# Patient Record
Sex: MALE | Race: WHITE | Employment: FULL TIME | URBAN - METROPOLITAN AREA
[De-identification: names, ages, dates, MRNs, and addresses within clinical notes are randomized per-mention and may not be internally consistent; named-entity substitution may affect disease eponyms.]

---

## 2018-11-19 ENCOUNTER — OFFICE VISIT (OUTPATIENT)
Dept: ORTHOPEDIC SURGERY | Age: 58
End: 2018-11-19
Payer: COMMERCIAL

## 2018-11-19 VITALS
WEIGHT: 220 LBS | DIASTOLIC BLOOD PRESSURE: 79 MMHG | BODY MASS INDEX: 33.34 KG/M2 | HEART RATE: 92 BPM | SYSTOLIC BLOOD PRESSURE: 129 MMHG | HEIGHT: 68 IN

## 2018-11-19 DIAGNOSIS — S72.114A CLOSED NONDISPLACED FRACTURE OF GREATER TROCHANTER OF RIGHT FEMUR, INITIAL ENCOUNTER (HCC): Primary | ICD-10-CM

## 2018-11-19 PROCEDURE — 99203 OFFICE O/P NEW LOW 30 MIN: CPT | Performed by: PHYSICIAN ASSISTANT

## 2018-11-19 RX ORDER — ACETAMINOPHEN 500 MG
500 TABLET ORAL EVERY 4 HOURS PRN
Qty: 60 TABLET | Refills: 0 | Status: SHIPPED | OUTPATIENT
Start: 2018-11-19 | End: 2018-12-11

## 2018-11-19 NOTE — PROGRESS NOTES
Date:  2018    Name:  Bhavana Pritchard  Address:  [address]    :  1960      Age:   59 y.o.    SSN:  [id number]      Medical Record Number:  [id number]    Reason for Visit:    Chief Complaint    Hip Pain (np right hip and groin pain x1wk after fall on c)      DOS:2018     HPI: Jose Luis Love is a 59 y.o. male here today for evaluation of right hip. On Tuesday, 2018, he was at the gas station and tripped over a parking curve and fell directly onto his right lateral hip. He was able to get up and ambulate, but there was excruciating pain. The following day he went to the [address] [place] [address] in [place], [place]. They diagnosed him with a right hip closed, mildly comminuted, nondisplaced avulsion greater trochanteric fracture. Diagnosis was confirmed via XR (2018) and CT (2018). They treated with crutches and #12 hydrocodone with discharge instructions to followup with us. His pain has been improving. He still gets occasional groin pain, the same groin pain that was present at time of injury. Overall his pain seems well controlled. No pain at rest. Denies any neurovascular changes. No numbness or tingling in lower extremity. No history of hip problems in the past.     Pain Assessment  Location of Pain:  (Hip)  Location Modifiers: Right  Severity of Pain: 7  Quality of Pain: Sharp (stabbing)  Duration of Pain: Persistent  Frequency of Pain: Intermittent  Aggravating Factors: Standing, Walking  Limiting Behavior: Yes  Relieving Factors: Rest  Result of Injury: Yes  Work-Related Injury: No  Are there other pain locations you wish to document?: No  ROS: All systems reviewed on patient intake form. Pertinent items are noted in HPI. Past Medical History:   Diagnosis Date    Hypertension         No past surgical history on file. No family history on file.     Social History     Social History    Marital status: Single     Spouse name: N/A    Number of children: N/A  Years of education: N/A     Social History Main Topics    Smoking status: Current Every Day Smoker     Years: 30.00     Types: Cigarettes    Smokeless tobacco: Current User    Alcohol use Yes    Drug use: No    Sexual activity: Not Asked     Other Topics Concern    None     Social History Narrative    None       Current Outpatient Prescriptions   Medication Sig Dispense Refill    AMLODIPINE-ATORVASTATIN PO Take by mouth      Amlodipine Besy-Benazepril HCl (LOTREL PO) Take by mouth      acetaminophen (TYLENOL) 500 MG tablet Take 1 tablet by mouth every 4 hours as needed for Pain 60 tablet 0     No current facility-administered medications for this visit. Allergies   Allergen Reactions    Penicillins        Vital signs:  /79   Pulse 92   Ht 5' 8\" (1.727 m)   Wt 220 lb (99.8 kg)   BMI 33.45 kg/m²         Neuro: Alert & oriented x 3,  normal,  no focal deficits noted. Normal affect. Eyes: sclera clear  Ears: Normal external ear  Mouth:  No perioral lesions  Pulm: Respirations unlabored and regular  Pulse: Regular rate    Skin: Warm, well perfused        RIGHT Hip Examination:    Inspection: Very mild ecchymosis. No erythema swelling or signs of infection.     Palpation: Mild tenderness over greater trochanter. No palpable masses.     Range of Motion: gentle range of motion well tolerated     Strength: deferred     Stability: deferred     Skin: There are no rashes, ulcerations or lesions.     Gait: Crutches     Vascular: Skin warm dry and well perfused. No calf tenderness.     Neurologic: No focal motor deficits. Sensation intact.       LEFT Hip Examination:    Inspection: No ecchymosis. No erythema swelling or signs of infection.     Palpation: No tenderness over greater trochanter.   No palpable masses.     Range of Motion: well tolerated     Strength: deferred     Stability: deferred     Skin: There are no rashes, ulcerations or lesions.     Gait: Crutches     Vascular: Skin warm dry and well perfused. No calf tenderness.     Neurologic: No focal motor deficits. Sensation intact.         Diagnostics:  Radiology:     X-rays and CT from 11/14/2018 reviewed showing a mildly comminuted and nondisplaced avulsion fracture of the greater trochanter of the right femur. The fracture may be encroaching onto the femoral neck, but there does not appear to be any femoral neck or intertrochanteric involvement. Imaging is originally from [facility] and can now be found in PACS. Assessment: 58-year-old male with right closed, mildly comminuted, nondisplaced avulsion fracture of the greater trochanter of the femur. Thus far fracture appears stable and we will treat conservatively with close monitoring. Plan: CT, x-rays, diagnosis, and treatment was reviewed with the patient. At this time it's appropriate to proceed conservatively. Would like him to continue using his crutches with toe-touch/flat foot weightbearing. Over-the-counter Tylenol as needed for pain. Regarding work, advised him that he should refrain from vigorous activity and would like and to be light duty for the next week until reevaluated. Follow-up in one week with Dr. Anita Aranda for repeat x-rays or sooner if needed. Randy Romo is in agreement with this plan. All questions were answered to patient's satisfaction and was encouraged to call with any further questions. No orders of the defined types were placed in this encounter. [id number]  Date:    11/23/2018      This dictation was performed with a verbal recognition program [facility]) and it was checked for errors. It is possible that there are still dictated errors within this office note. If so, please bring any errors to my attention for an addendum. All efforts were made to ensure that this office note is accurate.

## 2018-11-23 ENCOUNTER — TELEPHONE (OUTPATIENT)
Dept: ORTHOPEDIC SURGERY | Age: 58
End: 2018-11-23

## 2018-11-29 ENCOUNTER — OFFICE VISIT (OUTPATIENT)
Dept: ORTHOPEDIC SURGERY | Age: 58
End: 2018-11-29
Payer: COMMERCIAL

## 2018-11-29 VITALS
WEIGHT: 220.02 LBS | SYSTOLIC BLOOD PRESSURE: 153 MMHG | HEIGHT: 68 IN | HEART RATE: 85 BPM | DIASTOLIC BLOOD PRESSURE: 96 MMHG | BODY MASS INDEX: 33.35 KG/M2

## 2018-11-29 DIAGNOSIS — S72.114A CLOSED NONDISPLACED FRACTURE OF GREATER TROCHANTER OF RIGHT FEMUR, INITIAL ENCOUNTER (HCC): Primary | ICD-10-CM

## 2018-11-29 PROCEDURE — 99214 OFFICE O/P EST MOD 30 MIN: CPT | Performed by: ORTHOPAEDIC SURGERY

## 2018-11-29 NOTE — PROGRESS NOTES
History of Present Illness:  Severiano Li is a pleasant 59 y.o. male here today for followup on right hip of right hip. On Tuesday, 11/13/2018, he sustained right closed, mildly comminuted, nondisplaced avulsion fracture of the greater trochanter of the femur. Diagnosis confirmed via x-ray and CT. He has been compliant with flat foot weight bearing with crutches. His pain continues to improve, but he is still having occasional groin pain. Medical History:  Patient's medications, allergies, past medical, surgical, social and family histories were reviewed and updated as appropriate. Pertinent items are noted in HPI  Review of systems reviewed from Patient History Form dated on 11/19/2018 and available in the patient's chart under the Media tab. Vital Signs:  Vitals:    11/29/18 1636   BP: (!) 153/96   Pulse: 85         Constitutional: In no apparent distress. Normal affect. Alert and oriented X3 and is cooperative. RIGHT Hip Examination:     Inspection: No erythema, ecchymosis, swelling or signs of infection.     Palpation: No point tenderness. No palpable masses.     Range of Motion: gentle range of motion well tolerated     Strength: deferred     Stability: deferred     Skin: There are no rashes, ulcerations or lesions.     Gait: Crutches     Vascular: Skin warm dry and well perfused.  No calf tenderness.     Neurologic: No focal motor deficits. Sensation intact. Special Tests: Negative log roll. No pain with axial load. Radiology:     Plain radiographs of the right hip comprising 2 views: Radiographs were obtained and reviewed in the office: AP and frogleg lateral    Impression: routinely healing right closed, nondisplaced avulsion fracture of greater trochanter of the femur. CT images and report reviewed today by Dr. Paco Waggoner and fellow, Dr. Carol Whittaker.     IMPRESSION:    Acute mildly comminuted and nondisplaced avulsion fracture injury involving the  greater trochanteric right femur as

## 2018-12-11 ENCOUNTER — OFFICE VISIT (OUTPATIENT)
Dept: ORTHOPEDIC SURGERY | Age: 58
End: 2018-12-11
Payer: COMMERCIAL

## 2018-12-11 VITALS
WEIGHT: 220.02 LBS | HEART RATE: 80 BPM | DIASTOLIC BLOOD PRESSURE: 95 MMHG | SYSTOLIC BLOOD PRESSURE: 140 MMHG | BODY MASS INDEX: 33.35 KG/M2 | HEIGHT: 68 IN

## 2018-12-11 DIAGNOSIS — T14.8XXA FRACTURE: ICD-10-CM

## 2018-12-11 DIAGNOSIS — S72.111D: Primary | ICD-10-CM

## 2018-12-11 PROCEDURE — 99213 OFFICE O/P EST LOW 20 MIN: CPT | Performed by: ORTHOPAEDIC SURGERY

## 2018-12-11 NOTE — PROGRESS NOTES
History of Present Illness:  Wilmer Brewer is a pleasant 59 y.o. male comes in today for followup of right hip. On 11/13/2018 he sustained a right femur mildly comminuted, nondisplaced avulsion fracture of the greater trochanter. The diagnosis was confirmed via x-ray and CT. We've been treating him conservatively with close observation. Today he reports that his pain continues to improve. He feels stable with walking and has minimal pain. He still has some groin pain, but this is also getting better. No new injuries reported. Medical History:  Patient's medications, allergies, past medical, surgical, social and family histories were reviewed and updated as appropriate. Pertinent items are noted in HPI  Review of systems reviewed from Patient History Form dated on 11/19/2018 and available in the patient's chart under the Media tab. Vital Signs:  Vitals:    12/11/18 1725   BP: (!) 140/95   Pulse: 80         Constitutional: In no apparent distress. Normal affect. Alert and oriented X3 and is cooperative. Right Hip Examination:    Inspection:  No erythema swelling or signs of infection. Leg lengths symmetric.     Palpation: No point tenderness. No palpable masses.     Range of Motion: range of motion well tolerated     Strength:  deferred     Special Tests:  Negative Trendelenburg sign.     Stability: deferred     Skin: There are no rashes, ulcerations or lesions.     Gait: Normal.  No crutches.     Vascular: Skin warm dry and well perfused. No calf tenderness.     Neurologic: No focal motor deficits. Sensation intact.       Radiology:     No new x-rays obtained today. Assessment :  Wilmer Brewer is a 59 y.o. male with routinely healing right closed, nondisplaced avulsion fracture of greater trochanter of the femur. Impression:  Encounter Diagnoses   Name Primary?     Fracture     Closed avulsion fracture of greater trochanter of femur with routine healing, right Yes       Office

## 2019-01-02 ENCOUNTER — OFFICE VISIT (OUTPATIENT)
Dept: ORTHOPEDIC SURGERY | Age: 59
End: 2019-01-02
Payer: COMMERCIAL

## 2019-01-02 VITALS
WEIGHT: 220 LBS | BODY MASS INDEX: 33.34 KG/M2 | DIASTOLIC BLOOD PRESSURE: 106 MMHG | SYSTOLIC BLOOD PRESSURE: 154 MMHG | HEIGHT: 68 IN | HEART RATE: 90 BPM

## 2019-01-02 DIAGNOSIS — M25.551 RIGHT HIP PAIN: Primary | ICD-10-CM

## 2019-01-02 DIAGNOSIS — S72.111D: ICD-10-CM

## 2019-01-02 PROCEDURE — 99213 OFFICE O/P EST LOW 20 MIN: CPT | Performed by: ORTHOPAEDIC SURGERY

## 2019-02-21 ENCOUNTER — TELEPHONE (OUTPATIENT)
Dept: ORTHOPEDIC SURGERY | Age: 59
End: 2019-02-21